# Patient Record
Sex: FEMALE | Race: WHITE | Employment: OTHER | URBAN - METROPOLITAN AREA
[De-identification: names, ages, dates, MRNs, and addresses within clinical notes are randomized per-mention and may not be internally consistent; named-entity substitution may affect disease eponyms.]

---

## 2022-07-08 ENCOUNTER — HOSPITAL ENCOUNTER (OUTPATIENT)
Age: 82
Discharge: HOME | End: 2022-07-08
Payer: MEDICARE

## 2022-07-08 VITALS
DIASTOLIC BLOOD PRESSURE: 80 MMHG | TEMPERATURE: 98 F | RESPIRATION RATE: 16 BRPM | SYSTOLIC BLOOD PRESSURE: 116 MMHG | OXYGEN SATURATION: 97 % | HEART RATE: 72 BPM
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
## 2022-07-08 DIAGNOSIS — L03.011 CELLULITIS OF FINGER OF RIGHT HAND: ICD-10-CM

## 2022-07-08 DIAGNOSIS — S61.258A CAT BITE OF INDEX FINGER, INITIAL ENCOUNTER: Primary | ICD-10-CM

## 2022-07-08 RX ORDER — OLMESARTAN MEDOXOMIL 20 MG/1
20 TABLET ORAL DAILY
COMMUNITY

## 2022-07-08 RX ORDER — LEVOTHYROXINE SODIUM 137 UG/1
137 TABLET ORAL
COMMUNITY

## 2022-07-08 RX ORDER — AMOXICILLIN AND CLAVULANATE POTASSIUM 875; 125 MG/1; MG/1
1 TABLET, FILM COATED ORAL 2 TIMES DAILY
Qty: 20 TABLET | Refills: 0 | Status: SHIPPED | OUTPATIENT
Start: 2022-07-08 | End: 2022-07-18

## 2024-07-12 ENCOUNTER — APPOINTMENT (OUTPATIENT)
Dept: GENERAL RADIOLOGY | Facility: HOSPITAL | Age: 84
End: 2024-07-12
Attending: EMERGENCY MEDICINE
Payer: MEDICARE

## 2024-07-12 ENCOUNTER — HOSPITAL ENCOUNTER (INPATIENT)
Facility: HOSPITAL | Age: 84
LOS: 4 days | Discharge: HOME | End: 2024-07-16
Attending: EMERGENCY MEDICINE | Admitting: HOSPITALIST
Payer: MEDICARE

## 2024-07-12 ENCOUNTER — APPOINTMENT (OUTPATIENT)
Dept: CT IMAGING | Facility: HOSPITAL | Age: 84
End: 2024-07-12
Attending: EMERGENCY MEDICINE
Payer: MEDICARE

## 2024-07-12 DIAGNOSIS — K56.609 SBO (SMALL BOWEL OBSTRUCTION) (HCC): Primary | ICD-10-CM

## 2024-07-12 LAB
ALBUMIN SERPL-MCNC: 4 G/DL (ref 3.4–5)
ALBUMIN/GLOB SERPL: 0.9 {RATIO} (ref 1–2)
ALP LIVER SERPL-CCNC: 102 U/L
ALT SERPL-CCNC: 27 U/L
ANION GAP SERPL CALC-SCNC: 9 MMOL/L (ref 0–18)
AST SERPL-CCNC: 32 U/L (ref 15–37)
BASOPHILS # BLD AUTO: 0.04 X10(3) UL (ref 0–0.2)
BASOPHILS NFR BLD AUTO: 0.3 %
BILIRUB SERPL-MCNC: 0.9 MG/DL (ref 0.1–2)
BILIRUB UR QL STRIP.AUTO: NEGATIVE
BUN BLD-MCNC: 18 MG/DL (ref 9–23)
CALCIUM BLD-MCNC: 10 MG/DL (ref 8.5–10.1)
CHLORIDE SERPL-SCNC: 103 MMOL/L (ref 98–112)
CO2 SERPL-SCNC: 22 MMOL/L (ref 21–32)
COLOR UR AUTO: YELLOW
CREAT BLD-MCNC: 1.12 MG/DL
EGFRCR SERPLBLD CKD-EPI 2021: 49 ML/MIN/1.73M2 (ref 60–?)
EOSINOPHIL # BLD AUTO: 0.01 X10(3) UL (ref 0–0.7)
EOSINOPHIL NFR BLD AUTO: 0.1 %
ERYTHROCYTE [DISTWIDTH] IN BLOOD BY AUTOMATED COUNT: 13.8 %
GLOBULIN PLAS-MCNC: 4.5 G/DL (ref 2.8–4.4)
GLUCOSE BLD-MCNC: 146 MG/DL (ref 70–99)
GLUCOSE UR STRIP.AUTO-MCNC: NORMAL MG/DL
HCT VFR BLD AUTO: 44 %
HGB BLD-MCNC: 15.9 G/DL
HYALINE CASTS #/AREA URNS AUTO: PRESENT /LPF
IMM GRANULOCYTES # BLD AUTO: 0.04 X10(3) UL (ref 0–1)
IMM GRANULOCYTES NFR BLD: 0.3 %
KETONES UR STRIP.AUTO-MCNC: 20 MG/DL
LEUKOCYTE ESTERASE UR QL STRIP.AUTO: 75
LIPASE SERPL-CCNC: 69 U/L (ref 13–75)
LYMPHOCYTES # BLD AUTO: 1.05 X10(3) UL (ref 1–4)
LYMPHOCYTES NFR BLD AUTO: 8.6 %
MCH RBC QN AUTO: 32.4 PG (ref 26–34)
MCHC RBC AUTO-ENTMCNC: 36.1 G/DL (ref 31–37)
MCV RBC AUTO: 89.6 FL
MONOCYTES # BLD AUTO: 0.43 X10(3) UL (ref 0.1–1)
MONOCYTES NFR BLD AUTO: 3.5 %
NEUTROPHILS # BLD AUTO: 10.7 X10 (3) UL (ref 1.5–7.7)
NEUTROPHILS # BLD AUTO: 10.7 X10(3) UL (ref 1.5–7.7)
NEUTROPHILS NFR BLD AUTO: 87.2 %
NITRITE UR QL STRIP.AUTO: NEGATIVE
OSMOLALITY SERPL CALC.SUM OF ELEC: 283 MOSM/KG (ref 275–295)
PH UR STRIP.AUTO: 5 [PH] (ref 5–8)
PLATELET # BLD AUTO: 326 10(3)UL (ref 150–450)
POTASSIUM SERPL-SCNC: 5.3 MMOL/L (ref 3.5–5.1)
PROT SERPL-MCNC: 8.5 G/DL (ref 6.4–8.2)
PROT UR STRIP.AUTO-MCNC: 70 MG/DL
RBC # BLD AUTO: 4.91 X10(6)UL
SODIUM SERPL-SCNC: 134 MMOL/L (ref 136–145)
SP GR UR STRIP.AUTO: 1.03 (ref 1–1.03)
UROBILINOGEN UR STRIP.AUTO-MCNC: 2 MG/DL
WBC # BLD AUTO: 12.3 X10(3) UL (ref 4–11)

## 2024-07-12 PROCEDURE — 87086 URINE CULTURE/COLONY COUNT: CPT | Performed by: EMERGENCY MEDICINE

## 2024-07-12 PROCEDURE — 85025 COMPLETE CBC W/AUTO DIFF WBC: CPT

## 2024-07-12 PROCEDURE — 74177 CT ABD & PELVIS W/CONTRAST: CPT | Performed by: EMERGENCY MEDICINE

## 2024-07-12 PROCEDURE — 83690 ASSAY OF LIPASE: CPT | Performed by: EMERGENCY MEDICINE

## 2024-07-12 PROCEDURE — 96361 HYDRATE IV INFUSION ADD-ON: CPT

## 2024-07-12 PROCEDURE — 96374 THER/PROPH/DIAG INJ IV PUSH: CPT

## 2024-07-12 PROCEDURE — 80053 COMPREHEN METABOLIC PANEL: CPT

## 2024-07-12 PROCEDURE — 80053 COMPREHEN METABOLIC PANEL: CPT | Performed by: EMERGENCY MEDICINE

## 2024-07-12 PROCEDURE — 99285 EMERGENCY DEPT VISIT HI MDM: CPT

## 2024-07-12 PROCEDURE — 85025 COMPLETE CBC W/AUTO DIFF WBC: CPT | Performed by: EMERGENCY MEDICINE

## 2024-07-12 PROCEDURE — 74018 RADEX ABDOMEN 1 VIEW: CPT | Performed by: EMERGENCY MEDICINE

## 2024-07-12 PROCEDURE — 81001 URINALYSIS AUTO W/SCOPE: CPT | Performed by: EMERGENCY MEDICINE

## 2024-07-12 PROCEDURE — 96375 TX/PRO/DX INJ NEW DRUG ADDON: CPT

## 2024-07-12 PROCEDURE — S0028 INJECTION, FAMOTIDINE, 20 MG: HCPCS | Performed by: EMERGENCY MEDICINE

## 2024-07-12 PROCEDURE — 71045 X-RAY EXAM CHEST 1 VIEW: CPT | Performed by: EMERGENCY MEDICINE

## 2024-07-12 RX ORDER — ONDANSETRON 2 MG/ML
4 INJECTION INTRAMUSCULAR; INTRAVENOUS EVERY 4 HOURS PRN
Status: DISCONTINUED | OUTPATIENT
Start: 2024-07-12 | End: 2024-07-12 | Stop reason: ALTCHOICE

## 2024-07-12 RX ORDER — ONDANSETRON 2 MG/ML
4 INJECTION INTRAMUSCULAR; INTRAVENOUS EVERY 6 HOURS PRN
Status: DISCONTINUED | OUTPATIENT
Start: 2024-07-12 | End: 2024-07-16

## 2024-07-12 RX ORDER — HYDRALAZINE HYDROCHLORIDE 20 MG/ML
10 INJECTION INTRAMUSCULAR; INTRAVENOUS EVERY 6 HOURS PRN
Status: DISCONTINUED | OUTPATIENT
Start: 2024-07-12 | End: 2024-07-16

## 2024-07-12 RX ORDER — MORPHINE SULFATE 4 MG/ML
4 INJECTION, SOLUTION INTRAMUSCULAR; INTRAVENOUS ONCE
Status: COMPLETED | OUTPATIENT
Start: 2024-07-12 | End: 2024-07-12

## 2024-07-12 RX ORDER — SODIUM CHLORIDE 9 MG/ML
INJECTION, SOLUTION INTRAVENOUS CONTINUOUS
Status: ACTIVE | OUTPATIENT
Start: 2024-07-12 | End: 2024-07-12

## 2024-07-12 RX ORDER — SODIUM CHLORIDE 9 MG/ML
INJECTION, SOLUTION INTRAVENOUS CONTINUOUS
Status: DISCONTINUED | OUTPATIENT
Start: 2024-07-12 | End: 2024-07-15

## 2024-07-12 RX ORDER — ESTRADIOL AND LEVONORGESTREL .045; .015 MG/D; MG/D
1 PATCH TRANSDERMAL WEEKLY
COMMUNITY

## 2024-07-12 RX ORDER — HEPARIN SODIUM 5000 [USP'U]/ML
5000 INJECTION, SOLUTION INTRAVENOUS; SUBCUTANEOUS EVERY 8 HOURS SCHEDULED
Status: DISCONTINUED | OUTPATIENT
Start: 2024-07-12 | End: 2024-07-16

## 2024-07-12 RX ORDER — MORPHINE SULFATE 2 MG/ML
2 INJECTION, SOLUTION INTRAMUSCULAR; INTRAVENOUS EVERY 2 HOUR PRN
Status: DISCONTINUED | OUTPATIENT
Start: 2024-07-12 | End: 2024-07-16

## 2024-07-12 RX ORDER — FAMOTIDINE 10 MG/ML
20 INJECTION, SOLUTION INTRAVENOUS ONCE
Status: COMPLETED | OUTPATIENT
Start: 2024-07-12 | End: 2024-07-12

## 2024-07-12 RX ORDER — MORPHINE SULFATE 4 MG/ML
4 INJECTION, SOLUTION INTRAMUSCULAR; INTRAVENOUS EVERY 2 HOUR PRN
Status: DISCONTINUED | OUTPATIENT
Start: 2024-07-12 | End: 2024-07-16

## 2024-07-12 RX ORDER — ONDANSETRON 2 MG/ML
4 INJECTION INTRAMUSCULAR; INTRAVENOUS ONCE
Status: COMPLETED | OUTPATIENT
Start: 2024-07-12 | End: 2024-07-12

## 2024-07-12 RX ORDER — MORPHINE SULFATE 2 MG/ML
1 INJECTION, SOLUTION INTRAMUSCULAR; INTRAVENOUS EVERY 2 HOUR PRN
Status: DISCONTINUED | OUTPATIENT
Start: 2024-07-12 | End: 2024-07-16

## 2024-07-12 NOTE — CONSULTS
Suly Nicolas is a 84 year old female  Chief Complaint   Patient presents with    Abdomen/Flank Pain       HPI: 83 y/o female seen in er with small bowel obstruction  Similar episode at [facility] several years ago  Resolved with medical management  Previous gallbladder and appendix removal via long midline incision 40 years ago          Past Medical History:    Essential hypertension    Thyroid disease     Past Surgical History:   Procedure Laterality Date    Appendectomy       No family history on file.  Social History     Socioeconomic History    Marital status:    Tobacco Use    Smoking status: Never    Smokeless tobacco: Never     Social Determinants of Health      Received from [facility]    Housing Stability           EXAM: abd soft mild distention  long midline scar  non tender      IMAGING: cat scan report and images reviewed and discussed with her      IMPRESSION: small bowel obstruction      PLAN: ng decompression  Ivf  Observation  I reviewed possible surgical exploration if no improvement

## 2024-07-12 NOTE — PROGRESS NOTES
Alert & oriented x4. VSS on room air. DTV. NG tube secured at 52cm set to LIS. NPO with ice chips. Ambulates with standby assist. Denies chest pain/SOB. PRN zofran given for nausea/vomiting. Pain controlled per MAR. Patient updated on plan of care. Questions and concerns addressed. Safety precautions in place. Frequent rounds performed.

## 2024-07-12 NOTE — H&P
[facility] Hospitalist History and Physical      Chief Complaint   Patient presents with    Abdomen/Flank Pain        PCP: MANDY Velasco      History of Present Illness: Patient is a 84 year old female with PMH sig for HTN and hypothyroidism who presented to the ED for evaluation of abdominal pain with nausea and vomiting.  She states the pain started last night at round 7 pm, associated with multiple episodes of nausea and vomiting.   She states the pain was severe and cramping in nature.  It continued to worsen so she was brought to the ED.  Of note, pt had an SBO about 3 years ago that was managed with NGT.  No F/C.  She lives with her son and daughter in law.      In the ED, CT a/p showed SBO with transition point within the R mid abdomen in the region of the distal ileum.  NGT placed to LIS.      On my evaluation, pt states her abd pain is better.  Daughter at bedside.      Past Medical History:    Essential hypertension    Thyroid disease      Past Surgical History:   Procedure Laterality Date    Appendectomy          ALL:  No Known Allergies     Prior to Admission Medications   Medication Sig    levothyroxine 137 MCG Oral Tab Take 137 mcg by mouth before breakfast.    olmesartan 20 MG Oral Tab Take 20 mg by mouth daily.       Social History     Tobacco Use    Smoking status: Never    Smokeless tobacco: Never   Substance Use Topics    Alcohol use: Not on file        Fam Hx  No family history on file.    Review of Systems  Comprehensive ROS reviewed and negative except for what is stated in HPI.      OBJECTIVE:  /65   Pulse 95   Temp 97.8 °F (36.6 °C) (Temporal)   Resp 18   Wt 198 lb 6.6 oz (90 kg)   SpO2 96%   Gen: No acute distress, alert and oriented x3, no focal neurologic deficits  HEENT:  EOMI, PERRLA, OP clear, MMM.  NGT in place with bilious output.    Pulm: Lungs clear bilaterally, normal respiratory effort  CV: Heart with regular rate and rhythm, no murmur.  Normal PMI.     Abd: Abdomen soft, +mild TTP, moderately distended, no organomegaly, bowel sounds present  MSK: Full range of motion in extremities, no clubbing, no cyanosis  Skin: no rashes or lesions  Neuro:  Grossly intact, no focal deficits      Data Review:    LABS:   Lab Results   Component Value Date    WBC 12.3 07/12/2024    HGB 15.9 07/12/2024    HCT 44.0 07/12/2024    .0 07/12/2024    CREATSERUM 1.12 07/12/2024    BUN 18 07/12/2024     07/12/2024    K 5.3 07/12/2024     07/12/2024    CO2 22.0 07/12/2024     07/12/2024    CA 10.0 07/12/2024    ALB 4.0 07/12/2024    ALKPHO 102 07/12/2024    BILT 0.9 07/12/2024    TP 8.5 07/12/2024    AST 32 07/12/2024    ALT 27 07/12/2024    LIP 69 07/12/2024       CXR: image personally reviewed.      Radiology: CT ABDOMEN+PELVIS(CONTRAST ONLY)(CPT=74177)    Result Date: 7/12/2024  PROCEDURE:  CT ABDOMEN+PELVIS (CONTRAST ONLY) (CPT=74177)  COMPARISON:  None.  INDICATIONS:  hx of bowel obstruction, vomiting all night, abd pain, sweating, pt sts feels like obstruction  TECHNIQUE:  CT scanning was performed from the dome of the diaphragm to the pubic symphysis with non-ionic intravenous contrast material. Post contrast coronal MPR imaging was performed.  Dose reduction techniques were used. Dose information is transmitted to the ACR (American College of Radiology) NRDR (National Radiology Data Registry) which includes the Dose Index Registry.  PATIENT STATED HISTORY:(As transcribed by Technologist)  The patient complaints of bilateral upper quadrants abdominal pain, and nausea.   CONTRAST USED:  100cc of Isovue 370  FINDINGS:  LIVER:  No enlargement, atrophy, abnormal density, or significant focal lesion.  BILIARY:  Mild intra and extrahepatic biliary ductal dilatation is present with surgical clips within the right upper quadrant. PANCREAS:  No lesion, fluid collection, ductal dilatation, or atrophy.  SPLEEN:  No enlargement or focal lesion.  KIDNEYS:  There are  areas of focal cortical parenchymal loss which may represent prior infectious or ischemic insult.  10 mm possible cyst of the left lower pole present.  No specific further follow-up is suggested. ADRENALS:  No mass or enlargement.  AORTA/VASCULAR:  No aneurysm or dissection.  RETROPERITONEUM:  No mass or adenopathy.  BOWEL/MESENTERY:  Moderate-sized hiatal hernia is present.  Numerous dilated loops of small bowel are present.  These measure up to approximately 4.7 cm.  Transition point noted within the right mid abdomen on series 2, image 60 involving the distal ileum.  No evidence of pneumatosis or free intraperitoneal air at this time. ABDOMINAL WALL:  Small right inguinal hernia containing fat present. URINARY BLADDER:  Urinary bladder is decompressed with a 4 mm stone present. PELVIC NODES:  No adenopathy.  PELVIC ORGANS:  Unremarkable CT appearance although please note that uterus and ovaries are not well assessed with CT. BONES:  Multilevel degenerative changes noted throughout the thoracolumbar spine and pelvis. LUNG BASES:  No visible pulmonary or pleural disease.  OTHER:  Negative.             CONCLUSION:  Dilated loops of small bowel are present with a transition point within the right mid abdomen in the region of the distal ileum.  No evidence of free intraperitoneal air or pneumatosis at this time.  4 mm bladder calculus is present.   LOCATION:  [id number]   Dictated by (CST): Ben Perez MD on 7/12/2024 at 8:58 AM     Finalized by (CST): Ben Perez MD on 7/12/2024 at 9:06 AM          Assessment/Plan:     84 yr old female with PMH sig for HTN and hypothyroidism who presented to the ED for evaluation of abdominal pain with nausea and vomiting.    # Small bowel obstruction   - suspect adhesive related to prior abdominal surgeries   - cont NGT to LIS  - NPO  - IVFs  - pain  control, anti-emetics   - gen surg c/s appreciated     # Essential HTN  - elevated on admit  - hold olmesartan   - PRN IV hydralazine while  NPO    # Hypothyroidism   - hold levothyroxine while NPO  - start IV levothyroxine if NPO > 72 hours    DVT porphy - hep subcutaneous  Dispo: inpt care.  POC d/w pt and her daughter at bedside who agrees.     Outpatient records or previous hospital records reviewed.   [facility] hospitalist to continue to follow patient while in house  A total of 76 minutes taken with patient and coordinating care.  Greater than 50% face to face encounter.      Ramana Stockton DO  [facility] Hospitalist

## 2024-07-12 NOTE — ED PROVIDER NOTES
Patient Seen in: [facility] Emergency Department      History     Chief Complaint   Patient presents with    Abdomen/Flank Pain     Stated Complaint: hx of bowel obstruction, vomiting all night, abd pain, sweating, pt sts feels l*    Subjective:   HPI    84-year-old female presents today for evaluation of abdominal pain and vomiting.  Patient has reported increasing heartburn over the last month.  Her last bowel movement was 3:30 in the afternoon yesterday.  She woke this morning and felt nauseous.  Since 3 AM, she had 9 episodes of emesis.  She has not had any fevers or diarrhea.  She reports an upper abdominal discomfort.  She states the symptoms are similar to previous bowel obstructions.    Objective:   Past Medical History:    Essential hypertension    Thyroid disease              Past Surgical History:   Procedure Laterality Date    Appendectomy                  Social History     Socioeconomic History    Marital status:    Tobacco Use    Smoking status: Never    Smokeless tobacco: Never     Social Determinants of Health      Received from [facility]    Housing Stability              Review of Systems    Positive for stated Chief Complaint: Abdomen/Flank Pain    Other systems are as noted in HPI.  Constitutional and vital signs reviewed.      All other systems reviewed and negative except as noted above.    Physical Exam     ED Triage Vitals [07/12/24 0600]   BP (!) 164/80   Pulse 110   Resp 18   Temp 97.8 °F (36.6 °C)   Temp src Temporal   SpO2 98 %   O2 Device None (Room air)       Current Vitals:   Vital Signs  BP: 135/65  Pulse: 95  Resp: 18  Temp: 97.8 °F (36.6 °C)  Temp src: Temporal  MAP (mmHg): 85    Oxygen Therapy  SpO2: 96 %  O2 Device: None (Room air)            Physical Exam  Vitals and nursing note reviewed.   Constitutional:       Appearance: Normal appearance.   HENT:      Head: Normocephalic.      Nose: Nose normal.      Mouth/Throat:      Mouth: Mucous  membranes are moist.   Eyes:      Extraocular Movements: Extraocular movements intact.   Cardiovascular:      Rate and Rhythm: Normal rate.   Pulmonary:      Effort: Pulmonary effort is normal.   Abdominal:      General: Abdomen is flat.      Tenderness: There is abdominal tenderness in the right upper quadrant and epigastric area. There is no guarding or rebound.   Musculoskeletal:         General: Normal range of motion.   Skin:     General: Skin is warm.   Neurological:      General: No focal deficit present.      Mental Status: She is alert.   Psychiatric:         Mood and Affect: Mood normal.         ED Course     Labs Reviewed   URINALYSIS WITH CULTURE REFLEX - Abnormal; Notable for the following components:       Result Value    Clarity Urine Turbid (*)     Ketones Urine 20 (*)     Blood Urine 2+ (*)     Protein Urine 70 (*)     Urobilinogen Urine 2 (*)     Leukocyte Esterase Urine 75 (*)     Bacteria Urine Rare (*)     Squamous Epi. Cells Moderate (*)     Hyaline Casts Present (*)     All other components within normal limits   COMP METABOLIC PANEL (14) - Abnormal; Notable for the following components:    Glucose 146 (*)     Sodium 134 (*)     Potassium 5.3 (*)     Creatinine 1.12 (*)     eGFR-Cr 49 (*)     Total Protein 8.5 (*)     Globulin  4.5 (*)     A/G Ratio 0.9 (*)     All other components within normal limits   CBC W/ DIFFERENTIAL - Abnormal; Notable for the following components:    WBC 12.3 (*)     Neutrophil Absolute Prelim 10.70 (*)     Neutrophil Absolute 10.70 (*)     All other components within normal limits   LIPASE - Normal   CBC WITH DIFFERENTIAL WITH PLATELET    Narrative:     The following orders were created for panel order CBC With Differential With Platelet.  Procedure                               Abnormality         Status                     ---------                               -----------         ------                     CBC W/ DIFFERENTIAL[[id number]]          Abnormal             Final result                 Please view results for these tests on the individual orders.   RAINBOW DRAW LAVENDER   RAINBOW DRAW LIGHT GREEN   RAINBOW DRAW BLUE   URINE CULTURE, ROUTINE             XR CHEST AP PORTABLE  (CPT=71045)    Result Date: 7/12/2024  PROCEDURE:  XR CHEST AP PORTABLE  (CPT=71045)  TECHNIQUE:  AP chest radiograph was obtained.  COMPARISON:  [facility] , CT, CT ABDOMEN+PELVIS(CONTRAST ONLY)(CPT=74177), 7/12/2024, 8:33 AM.  INDICATIONS:  Verify correct tube placement  PATIENT STATED HISTORY: (As transcribed by Technologist)  Patient shares that she had some stomach issues over past few days. Patient has history of a blockage.              CONCLUSION:    Hiatal hernia is present.  Nasogastric tube has been placed.  It reaches at least the level of the gastroesophageal junction, with the distal tip may be nonvisualized because of degree of penetration.  Suggest obtaining additional x-ray view, centered over the lower chest and upper abdomen for better visualization of the tip of the nasogastric tube.  Basilar atelectasis bilateral.  Rim calcified left breast implant noted.  LOCATION:  [facility]      Dictated by (CST): Hong Pérez MD on 7/12/2024 at 9:43 AM     Finalized by (CST): Hong Pérez MD on 7/12/2024 at 9:44 AM       CT ABDOMEN+PELVIS(CONTRAST ONLY)(CPT=74177)    Result Date: 7/12/2024  PROCEDURE:  CT ABDOMEN+PELVIS (CONTRAST ONLY) (CPT=74177)  COMPARISON:  None.  INDICATIONS:  hx of bowel obstruction, vomiting all night, abd pain, sweating, pt sts feels like obstruction  TECHNIQUE:  CT scanning was performed from the dome of the diaphragm to the pubic symphysis with non-ionic intravenous contrast material. Post contrast coronal MPR imaging was performed.  Dose reduction techniques were used. Dose information is transmitted to the ACR (American College of Radiology) NRDR (National Radiology Data Registry) which includes the Dose Index Registry.  PATIENT STATED HISTORY:(As transcribed by  Technologist)  The patient complaints of bilateral upper quadrants abdominal pain, and nausea.   CONTRAST USED:  100cc of Isovue 370  FINDINGS:  LIVER:  No enlargement, atrophy, abnormal density, or significant focal lesion.  BILIARY:  Mild intra and extrahepatic biliary ductal dilatation is present with surgical clips within the right upper quadrant. PANCREAS:  No lesion, fluid collection, ductal dilatation, or atrophy.  SPLEEN:  No enlargement or focal lesion.  KIDNEYS:  There are areas of focal cortical parenchymal loss which may represent prior infectious or ischemic insult.  10 mm possible cyst of the left lower pole present.  No specific further follow-up is suggested. ADRENALS:  No mass or enlargement.  AORTA/VASCULAR:  No aneurysm or dissection.  RETROPERITONEUM:  No mass or adenopathy.  BOWEL/MESENTERY:  Moderate-sized hiatal hernia is present.  Numerous dilated loops of small bowel are present.  These measure up to approximately 4.7 cm.  Transition point noted within the right mid abdomen on series 2, image 60 involving the distal ileum.  No evidence of pneumatosis or free intraperitoneal air at this time. ABDOMINAL WALL:  Small right inguinal hernia containing fat present. URINARY BLADDER:  Urinary bladder is decompressed with a 4 mm stone present. PELVIC NODES:  No adenopathy.  PELVIC ORGANS:  Unremarkable CT appearance although please note that uterus and ovaries are not well assessed with CT. BONES:  Multilevel degenerative changes noted throughout the thoracolumbar spine and pelvis. LUNG BASES:  No visible pulmonary or pleural disease.  OTHER:  Negative.             CONCLUSION:  Dilated loops of small bowel are present with a transition point within the right mid abdomen in the region of the distal ileum.  No evidence of free intraperitoneal air or pneumatosis at this time.  4 mm bladder calculus is present.   LOCATION:  [id number]   Dictated by (CST): Ben Perez MD on 7/12/2024 at 8:58 AM     Finalized  by (CST): Ben Perez MD on 7/12/2024 at 9:06 AM               [facility]      Differential Diagnosis  84-year-old female presents today for evaluation of 9 episodes of emesis over the last few hours along with an upper abdominal discomfort.  She states the pain is similar to her previous bowel obstruction.  Differential include bowel obstruction, pancreatitis, choledocholithiasis.  Plan for CT along with labs.  Will reassess    9:31 am  CT concerning for bowel obstruction.  NG tube ordered.  I spoke with the hospitalist and general surgery in regards to admission.  Patient updated on findings and agreeable to admission.    Discussions of Management    I spoke with the hospitalist and general surgery in regards to admission.      -----  RN advanced NG tube after second x ray      Admission disposition: 7/12/2024  9:31 AM                                        Medical Decision Making      Disposition and Plan     Clinical Impression:  1. SBO (small bowel obstruction) ([facility])         Disposition:  Admit  7/12/2024  9:31 am    Follow-up:  No follow-up provider specified.        Medications Prescribed:  Current Discharge Medication List                            Hospital Problems       Present on Admission  Date Reviewed: 7/8/2022            ICD-10-CM Noted POA    * (Principal) SBO (small bowel obstruction) ([facility]) K56.609 7/12/2024 Unknown

## 2024-07-12 NOTE — ED QUICK NOTES
Orders for admission, patient is aware of plan and ready to go upstairs. Any questions, please call ED RN Renetta at extension [phone].     Patient Covid vaccination status: Partially vaccinated     COVID Test Ordered in ED: None    COVID Suspicion at Admission: N/A    Running Infusions:  None    Mental Status/LOC at time of transport: A&O x 4, family support at bedside.    Other pertinent information:   CIWA score: N/A   NIH score:  N/A

## 2024-07-13 ENCOUNTER — APPOINTMENT (OUTPATIENT)
Dept: GENERAL RADIOLOGY | Facility: HOSPITAL | Age: 84
End: 2024-07-13
Attending: SURGERY
Payer: MEDICARE

## 2024-07-13 LAB
ANION GAP SERPL CALC-SCNC: 11 MMOL/L (ref 0–18)
BASOPHILS # BLD AUTO: 0.01 X10(3) UL (ref 0–0.2)
BASOPHILS NFR BLD AUTO: 0.1 %
BUN BLD-MCNC: 23 MG/DL (ref 9–23)
CALCIUM BLD-MCNC: 8.3 MG/DL (ref 8.5–10.1)
CHLORIDE SERPL-SCNC: 109 MMOL/L (ref 98–112)
CO2 SERPL-SCNC: 21 MMOL/L (ref 21–32)
CREAT BLD-MCNC: 0.84 MG/DL
EGFRCR SERPLBLD CKD-EPI 2021: 69 ML/MIN/1.73M2 (ref 60–?)
EOSINOPHIL # BLD AUTO: 0 X10(3) UL (ref 0–0.7)
EOSINOPHIL NFR BLD AUTO: 0 %
ERYTHROCYTE [DISTWIDTH] IN BLOOD BY AUTOMATED COUNT: 14.4 %
GLUCOSE BLD-MCNC: 95 MG/DL (ref 70–99)
HCT VFR BLD AUTO: 38.3 %
HGB BLD-MCNC: 13.1 G/DL
IMM GRANULOCYTES # BLD AUTO: 0.03 X10(3) UL (ref 0–1)
IMM GRANULOCYTES NFR BLD: 0.4 %
LYMPHOCYTES # BLD AUTO: 1.09 X10(3) UL (ref 1–4)
LYMPHOCYTES NFR BLD AUTO: 13.2 %
MCH RBC QN AUTO: 31.3 PG (ref 26–34)
MCHC RBC AUTO-ENTMCNC: 34.2 G/DL (ref 31–37)
MCV RBC AUTO: 91.4 FL
MONOCYTES # BLD AUTO: 0.57 X10(3) UL (ref 0.1–1)
MONOCYTES NFR BLD AUTO: 6.9 %
NEUTROPHILS # BLD AUTO: 6.53 X10 (3) UL (ref 1.5–7.7)
NEUTROPHILS # BLD AUTO: 6.53 X10(3) UL (ref 1.5–7.7)
NEUTROPHILS NFR BLD AUTO: 79.4 %
OSMOLALITY SERPL CALC.SUM OF ELEC: 295 MOSM/KG (ref 275–295)
PLATELET # BLD AUTO: 259 10(3)UL (ref 150–450)
POTASSIUM SERPL-SCNC: 4.2 MMOL/L (ref 3.5–5.1)
RBC # BLD AUTO: 4.19 X10(6)UL
SODIUM SERPL-SCNC: 141 MMOL/L (ref 136–145)
WBC # BLD AUTO: 8.2 X10(3) UL (ref 4–11)

## 2024-07-13 PROCEDURE — 74250 X-RAY XM SM INT 1CNTRST STD: CPT | Performed by: SURGERY

## 2024-07-13 PROCEDURE — 80048 BASIC METABOLIC PNL TOTAL CA: CPT | Performed by: INTERNAL MEDICINE

## 2024-07-13 PROCEDURE — 85025 COMPLETE CBC W/AUTO DIFF WBC: CPT | Performed by: INTERNAL MEDICINE

## 2024-07-13 RX ORDER — KETOROLAC TROMETHAMINE 15 MG/ML
15 INJECTION, SOLUTION INTRAMUSCULAR; INTRAVENOUS EVERY 6 HOURS PRN
Status: DISPENSED | OUTPATIENT
Start: 2024-07-13 | End: 2024-07-15

## 2024-07-13 NOTE — PROGRESS NOTES
[facility]   part of [facility] Hospitalist Progress Note     Suly Nicolas Patient Status:  Inpatient    1940 MRN [id number]   Location [facility] 3NW-A Attending Ramana Stockton, DO   Hosp Day # 1 PCP MANDY Velasco     Follow Up:  The encounter diagnosis was SBO (small bowel obstruction) (HCC).    Subjective:     Patient seen and examined.  Back from SBFT.  She c/o some lower abd pain, but improved with morphine IV.  Had an episode of emesis after drinking the barium earlier.  No fevers.      Objective:    Review of Systems:   10 point ROS completed and was negative, except for pertinent positive and negatives stated in subjective.    Vital signs:  Temp:  [98.2 °F (36.8 °C)-99.1 °F (37.3 °C)] 98.7 °F (37.1 °C)  Pulse:  [] 56  Resp:  [16-18] 18  BP: (135-147)/(60-75) 135/60  SpO2:  [94 %-99 %] 97 %    Physical Exam:    Gen: No acute distress, alert and oriented x3, no focal neurologic deficits  HEENT:  EOMI, PERRLA, OP clear, MMM.  NGT in place with bilious output.    Pulm: Lungs clear bilaterally, normal respiratory effort  CV: Heart with regular rate and rhythm, no murmur.  Normal PMI.    Abd: Abdomen soft, +mild TTP, moderately distended, no organomegaly, bowel sounds present  MSK: Full range of motion in extremities, no clubbing, no cyanosis  Skin: no rashes or lesions  Neuro:  Grossly intact, no focal deficits      Diagnostic Data:    Labs:  Recent Labs   Lab 24  0637 24  0824   WBC 12.3* 8.2   HGB 15.9 13.1   MCV 89.6 91.4   .0 259.0       Recent Labs   Lab 24  0637 24  0824   * 95   BUN 18 23   CREATSERUM 1.12* 0.84   CA 10.0 8.3*   ALB 4.0  --    * 141   K 5.3* 4.2    109   CO2 22.0 21.0   ALKPHO 102  --    AST 32  --    ALT 27  --    BILT 0.9  --    TP 8.5*  --        Estimated Creatinine Clearance: 46.5 mL/min (based on SCr of 0.84 mg/dL).    No results for input(s): \"PTP\", \"INR\" in the last  168 hours.         COVID-19 Lab Results    COVID-19  No results found for: \"COVID19\"    Pro-Calcitonin  No results for input(s): \"PCT\" in the last 168 hours.    Cardiac  No results for input(s): \"TROP\", \"PBNP\" in the last 168 hours.    Creatinine Kinase  No results for input(s): \"CK\" in the last 168 hours.    Inflammatory Markers  No results for input(s): \"CRP\", \"FRANCO\", \"LDH\", \"DDIMER\" in the last 168 hours.    Imaging: Imaging data reviewed in Epic.    Medications:    heparin  5,000 Units Subcutaneous Q8H QUENTIN       Assessment & Plan:      84 yr old female with PMH sig for HTN and hypothyroidism who presented to the ED for evaluation of abdominal pain with nausea and vomiting.     # Small bowel obstruction   - suspect adhesive related to prior abdominal surgeries   - cont NGT to LIS  - NPO  - IVFs  - await SBFT     # Essential HTN  - elevated on admit  - hold olmesartan   - PRN IV hydralazine while NPO     # Hypothyroidism   - hold levothyroxine while NPO  - start IV levothyroxine if NPO > 72 hours    Plan of care: inpt care.     Plan of care discussed with patient or family at bedside.    Ramana Stockton DO    Supplementary Documentation:     Quality:  DVT Prophylaxis: hep subcutaneous   CODE status: FULL  Gandhi: no  Central line: no  If COVID testing is negative, may discontinue isolation: yes     Estimated date of discharge: TBD  Discharge is dependent on: clinical course   At this point Ms. Nicolas is expected to be discharge to: home     Plan of care discussed with pt

## 2024-07-13 NOTE — PROGRESS NOTES
Pt. Returned from xray and had a large emesis.  Zofran given.  Toradol given for lower back pain from laying on the table.  Per xray do not hook NGT back until follow-up xrays done.

## 2024-07-13 NOTE — PROGRESS NOTES
Small bowel follow thru personally reviewed    Reveals high grade small bowel obstruction    Surgical exploration scheduled for 1030am Sunday    Ng to suction until surgery

## 2024-07-13 NOTE — PROGRESS NOTES
Suly Nicolas is a 84 year old female  Chief Complaint   Patient presents with    Abdomen/Flank Pain       HPI: 85 y/o female admitted yesterday with small bowel obstruction  Comfortable thou no flatus          Past Medical History:    Essential hypertension    Thyroid disease     Past Surgical History:   Procedure Laterality Date    Appendectomy       No family history on file.  Social History     Socioeconomic History    Marital status:    Tobacco Use    Smoking status: Never    Smokeless tobacco: Never     Social Determinants of Health     Food Insecurity: No Food Insecurity (7/12/2024)    Food Insecurity     Food Insecurity: Never true   Transportation Needs: No Transportation Needs (7/12/2024)    Transportation Needs     Lack of Transportation: No   Housing Stability: Low Risk  (7/12/2024)    Housing Stability     Housing Instability: No           EXAM: abd soft moderate distention  non tender            IMPRESSION: small bowel obstruction      PLAN: sbft today to r/o high grade obstruction  I reviewed surgery if it becomes necessary

## 2024-07-13 NOTE — PROGRESS NOTES
Pt is alert and oriented, VSS  on room air.   NGT secured at 52cm, to LIS, output is brown. Keeping NPO with ice chips at times. Reports no nausea tonight. Pain is mild to moderate, Prn Morphine give 1 time. Pt encouraged to ambulate. Heparin subcu for DVT prophylaxis. Pt updated on the plan of care, call light in reach.

## 2024-07-13 NOTE — PLAN OF CARE
Abd soft, distended, tender, declines pain meds, no n/v.  NGT to LIS with brown drainage.  NPO except ice.

## 2024-07-14 ENCOUNTER — APPOINTMENT (OUTPATIENT)
Dept: GENERAL RADIOLOGY | Facility: HOSPITAL | Age: 84
End: 2024-07-14
Attending: SURGERY
Payer: MEDICARE

## 2024-07-14 LAB
ANION GAP SERPL CALC-SCNC: 9 MMOL/L (ref 0–18)
BUN BLD-MCNC: 26 MG/DL (ref 9–23)
CALCIUM BLD-MCNC: 7.7 MG/DL (ref 8.5–10.1)
CHLORIDE SERPL-SCNC: 111 MMOL/L (ref 98–112)
CO2 SERPL-SCNC: 20 MMOL/L (ref 21–32)
CREAT BLD-MCNC: 0.74 MG/DL
EGFRCR SERPLBLD CKD-EPI 2021: 81 ML/MIN/1.73M2 (ref 60–?)
GLUCOSE BLD-MCNC: 70 MG/DL (ref 70–99)
OSMOLALITY SERPL CALC.SUM OF ELEC: 293 MOSM/KG (ref 275–295)
PLATELET # BLD AUTO: 224 10(3)UL (ref 150–450)
POTASSIUM SERPL-SCNC: 3.7 MMOL/L (ref 3.5–5.1)
SODIUM SERPL-SCNC: 140 MMOL/L (ref 136–145)
TSI SER-ACNC: 2.08 MIU/ML (ref 0.36–3.74)

## 2024-07-14 PROCEDURE — 74019 RADEX ABDOMEN 2 VIEWS: CPT | Performed by: SURGERY

## 2024-07-14 PROCEDURE — 71045 X-RAY EXAM CHEST 1 VIEW: CPT | Performed by: SURGERY

## 2024-07-14 PROCEDURE — 84443 ASSAY THYROID STIM HORMONE: CPT | Performed by: INTERNAL MEDICINE

## 2024-07-14 PROCEDURE — 85049 AUTOMATED PLATELET COUNT: CPT | Performed by: INTERNAL MEDICINE

## 2024-07-14 PROCEDURE — 80048 BASIC METABOLIC PNL TOTAL CA: CPT | Performed by: INTERNAL MEDICINE

## 2024-07-14 RX ORDER — LEVOTHYROXINE SODIUM 20 UG/ML
100 INJECTION, SOLUTION INTRAVENOUS DAILY
Status: DISCONTINUED | OUTPATIENT
Start: 2024-07-21 | End: 2024-07-15

## 2024-07-14 NOTE — PROGRESS NOTES
[facility]   part of [facility] Hospitalist Progress Note     Suly Nicolas Patient Status:  Inpatient    1940 MRN [id number]   Location [facility] 3NW-A Attending Ramana Stockton, DO   Hosp Day # 2 PCP MANDY Velasco     Follow Up:  The encounter diagnosis was SBO (small bowel obstruction) (HCC).    Subjective:     Patient seen and examined.  SBFT yesterday showed high grade obstruction.  Plan for surgical exploration today.   Denies worsening pain.  Had several BM's overnight.     Objective:    Review of Systems:   10 point ROS completed and was negative, except for pertinent positive and negatives stated in subjective.    Vital signs:  Temp:  [98.7 °F (37.1 °C)-99.2 °F (37.3 °C)] 99 °F (37.2 °C)  Pulse:  [56-93] 93  Resp:  [18] 18  BP: (135-147)/(60-79) 137/61  SpO2:  [93 %-97 %] 93 %    Physical Exam:    Gen: No acute distress, alert and oriented x3, no focal neurologic deficits  HEENT:  EOMI, PERRLA, OP clear, MMM.  NGT in place with bilious output.    Pulm: Lungs clear bilaterally, normal respiratory effort  CV: Heart with regular rate and rhythm, no murmur.  Normal PMI.    Abd: Abdomen soft, +mild TTP, moderately distended, no organomegaly, bowel sounds present  MSK: Full range of motion in extremities, no clubbing, no cyanosis  Skin: no rashes or lesions  Neuro:  Grossly intact, no focal deficits      Diagnostic Data:    Labs:  Recent Labs   Lab 24  0637 24  0824  0600   WBC 12.3* 8.2  --    HGB 15.9 13.1  --    MCV 89.6 91.4  --    .0 259.0 224.0       Recent Labs   Lab 24  0637 24  0824 24  0600   * 95 70   BUN 18 23 26*   CREATSERUM 1.12* 0.84 0.74   CA 10.0 8.3* 7.7*   ALB 4.0  --   --    * 141 140   K 5.3* 4.2 3.7    109 111   CO2 22.0 21.0 20.0*   ALKPHO 102  --   --    AST 32  --   --    ALT 27  --   --    BILT 0.9  --   --    TP 8.5*  --   --        Estimated Creatinine Clearance:  52.7 mL/min (based on SCr of 0.74 mg/dL).    No results for input(s): \"PTP\", \"INR\" in the last 168 hours.         COVID-19 Lab Results    COVID-19  No results found for: \"COVID19\"    Pro-Calcitonin  No results for input(s): \"PCT\" in the last 168 hours.    Cardiac  No results for input(s): \"TROP\", \"PBNP\" in the last 168 hours.    Creatinine Kinase  No results for input(s): \"CK\" in the last 168 hours.    Inflammatory Markers  No results for input(s): \"CRP\", \"FRANCO\", \"LDH\", \"DDIMER\" in the last 168 hours.    Imaging: Imaging data reviewed in Epic.    Medications:    pantoprazole  40 mg Intravenous Q24H    heparin  5,000 Units Subcutaneous Q8H QUENTIN       Assessment & Plan:      84 yr old female with PMH sig for HTN and hypothyroidism who presented to the ED for evaluation of abdominal pain with nausea and vomiting.     # Small bowel obstruction   - high grade obstruction seen on SBFT from 7/13  - plan surgical exploration today  - gen surg folllowing      # Essential HTN  - elevated on admit  - hold olmesartan   - PRN IV hydralazine while NPO     # Hypothyroidism   - hold levothyroxine while NPO  - check TSH  - start IV levothyroxine if NPO > 72 hours, will start in AM if TSH not within normal range.  If TSH is within normal range, can hold off on starting levothyroxine for up to 1 week if necessary    Plan of care: inpt care.     Plan of care discussed with patient or family at bedside.    Ramana Stockton DO    Supplementary Documentation:     Quality:  DVT Prophylaxis: hep subcutaneous   CODE status: FULL  Gandhi: no  Central line: no  If COVID testing is negative, may discontinue isolation: yes     Estimated date of discharge: TBD  Discharge is dependent on: clinical course   At this point Ms. Nicolas is expected to be discharge to: home     Plan of care discussed with pt

## 2024-07-14 NOTE — PROGRESS NOTES
A&Ox4. VSS. RA. .  GI: Abdomen soft, nondistended. Passing gas, reports BM overnight.NG to LIS draining brown drainage.  Denies nausea.  : Voids.  Pain controlled with PRN pain medications  Up ad loida.  Diet:NPO   IVF running per order.  All appropriate safety measures in place. All questions and concerns addressed. Will continue to monitor

## 2024-07-14 NOTE — PROGRESS NOTES
Pt is alert and oriented. VSS on room air.   NGT came out while pt was in the bathroom, new NGT placed successfully and checked by Xray, secured at 53cm, brown output.  Pt is NPO, plan for surgery but would like to speak with surgeon before signing paperwork.   Pt reported that she passed gas and loose stool in bathroom. C diff to be r/o per protocol. Bowel sounds are hypoactive, belching. Poc discussed, call light in reach.

## 2024-07-14 NOTE — CONSULTS
[facility] Note: Automatic IV Levothyroxine Hold Protocol    Levothyroxine 100 mcg IV q24h was ordered by Dr. Stockton.  Component  Ref Range & Units 7/14/24 0600   TSH  0.358 - 3.740 mIU/mL TSH: 2.080     The patient meets criteria to hold IV levothyroxine for 7 days per P&T approved protocol.  Patient status will be monitored daily for eligibility to start oral levothyroxine.  If the patient is not able to be restarted on an oral dose within 7 days of the medication being ordered, IV levothyroxine will be started.  Pharmacy will continue to assess daily.    Thank you,   Mirtha Sharma, PharmD  7/14/202411:00 AM  [facility]: [phone]

## 2024-07-14 NOTE — PROGRESS NOTES
Suly Nicolas is a 84 year old female  Chief Complaint   Patient presents with    Abdomen/Flank Pain       Admitted with small bowel obstruction  Sbft suggests high grade sbo  Last nite patient passed flatus and had bm  Feels better this am  Exploration put on hold  Obstructive series ordered for re evaluation  Son at bedside            Past Medical History:    Essential hypertension    Thyroid disease     Past Surgical History:   Procedure Laterality Date    Appendectomy       No family history on file.  Social History     Socioeconomic History    Marital status:    Tobacco Use    Smoking status: Never    Smokeless tobacco: Never     Social Determinants of Health     Food Insecurity: No Food Insecurity (7/12/2024)    Food Insecurity     Food Insecurity: Never true   Transportation Needs: No Transportation Needs (7/12/2024)    Transportation Needs     Lack of Transportation: No   Housing Stability: Low Risk  (7/12/2024)    Housing Stability     Housing Instability: No           EXAM: abd soft  some distention  Non tender      IMAGING: sbft reviewed discussed with them        IMPRESSION: possible resolution of small bowel obstruction      PLAN: will re evaluated with obstructive series today  If significant improvement will observe  Otherwise may require exploration later today or tomorrow

## 2024-07-14 NOTE — BH PROGRESS NOTE
Warm packs for back pain, nausea resolved, ngt with brown output.  Pt. Wishes to talk with MD before signing consent.  Family at bedside.

## 2024-07-15 LAB
ANION GAP SERPL CALC-SCNC: 11 MMOL/L (ref 0–18)
BASOPHILS # BLD AUTO: 0.02 X10(3) UL (ref 0–0.2)
BASOPHILS NFR BLD AUTO: 0.3 %
BUN BLD-MCNC: 19 MG/DL (ref 9–23)
CALCIUM BLD-MCNC: 7.6 MG/DL (ref 8.5–10.1)
CHLORIDE SERPL-SCNC: 113 MMOL/L (ref 98–112)
CO2 SERPL-SCNC: 18 MMOL/L (ref 21–32)
CREAT BLD-MCNC: 0.67 MG/DL
EGFRCR SERPLBLD CKD-EPI 2021: 87 ML/MIN/1.73M2 (ref 60–?)
EOSINOPHIL # BLD AUTO: 0.05 X10(3) UL (ref 0–0.7)
EOSINOPHIL NFR BLD AUTO: 0.8 %
ERYTHROCYTE [DISTWIDTH] IN BLOOD BY AUTOMATED COUNT: 13.9 %
GLUCOSE BLD-MCNC: 65 MG/DL (ref 70–99)
GLUCOSE BLD-MCNC: 79 MG/DL (ref 70–99)
HCT VFR BLD AUTO: 34 %
HGB BLD-MCNC: 11.7 G/DL
IMM GRANULOCYTES # BLD AUTO: 0.02 X10(3) UL (ref 0–1)
IMM GRANULOCYTES NFR BLD: 0.3 %
LYMPHOCYTES # BLD AUTO: 1.64 X10(3) UL (ref 1–4)
LYMPHOCYTES NFR BLD AUTO: 25 %
MCH RBC QN AUTO: 31.7 PG (ref 26–34)
MCHC RBC AUTO-ENTMCNC: 34.4 G/DL (ref 31–37)
MCV RBC AUTO: 92.1 FL
MONOCYTES # BLD AUTO: 0.56 X10(3) UL (ref 0.1–1)
MONOCYTES NFR BLD AUTO: 8.5 %
NEUTROPHILS # BLD AUTO: 4.28 X10 (3) UL (ref 1.5–7.7)
NEUTROPHILS # BLD AUTO: 4.28 X10(3) UL (ref 1.5–7.7)
NEUTROPHILS NFR BLD AUTO: 65.1 %
OSMOLALITY SERPL CALC.SUM OF ELEC: 294 MOSM/KG (ref 275–295)
PLATELET # BLD AUTO: 212 10(3)UL (ref 150–450)
POTASSIUM SERPL-SCNC: 3.5 MMOL/L (ref 3.5–5.1)
RBC # BLD AUTO: 3.69 X10(6)UL
SODIUM SERPL-SCNC: 142 MMOL/L (ref 136–145)
WBC # BLD AUTO: 6.6 X10(3) UL (ref 4–11)

## 2024-07-15 PROCEDURE — 80048 BASIC METABOLIC PNL TOTAL CA: CPT | Performed by: INTERNAL MEDICINE

## 2024-07-15 PROCEDURE — 82962 GLUCOSE BLOOD TEST: CPT

## 2024-07-15 PROCEDURE — 85025 COMPLETE CBC W/AUTO DIFF WBC: CPT | Performed by: INTERNAL MEDICINE

## 2024-07-15 RX ORDER — KETOROLAC TROMETHAMINE 15 MG/ML
15 INJECTION, SOLUTION INTRAMUSCULAR; INTRAVENOUS ONCE AS NEEDED
Status: COMPLETED | OUTPATIENT
Start: 2024-07-15 | End: 2024-07-15

## 2024-07-15 NOTE — PLAN OF CARE
Patient alert and oriented x4, vital signs stable on room air. IVF infusing, up ad loida to void. Pt reports some back pain, treated per mar. Tolerating clear liquids. Safety measures in place, questions addressed, plan of care discussed with patient.

## 2024-07-15 NOTE — PROGRESS NOTES
[facility]   part of [facility] Hospitalist Progress Note     Suly Nicolas Patient Status:  Inpatient    1940 MRN [id number]   Location [facility] 3NW-A Attending Ramana Stockton, DO   Hosp Day # 3 PCP MANDY Velasco     Follow Up:  The encounter diagnosis was SBO (small bowel obstruction) (HCC).    Subjective:     Patient seen and examined.    Feeling much better.  Tube out.  Denies CP/SOB.  NAD.     Objective:    Review of Systems:   10 point ROS completed and was negative, except for pertinent positive and negatives stated in subjective.    Vital signs:  Temp:  [98.3 °F (36.8 °C)-98.7 °F (37.1 °C)] 98.6 °F (37 °C)  Pulse:  [84-99] 89  Resp:  [16-18] 16  BP: (138-150)/(53-67) 143/65  SpO2:  [95 %-97 %] 97 %    Physical Exam:    Gen: No acute distress, alert and oriented x3, no focal neurologic deficits  HEENT:  EOMI, PERRLA, OP clear, MMM.  NGT in place with bilious output.    Pulm: Lungs clear bilaterally, normal respiratory effort  CV: Heart with regular rate and rhythm, no murmur.  Normal PMI.    Abd: Abdomen soft, +mild TTP, moderately distended, no organomegaly, bowel sounds present  MSK: Full range of motion in extremities, no clubbing, no cyanosis  Skin: no rashes or lesions  Neuro:  Grossly intact, no focal deficits      Diagnostic Data:    Labs:  Recent Labs   Lab 07/12/24  0637 07/13/24  0824 07/14/24  0600 07/15/24  0605   WBC 12.3* 8.2  --  6.6   HGB 15.9 13.1  --  11.7*   MCV 89.6 91.4  --  92.1   .0 259.0 224.0 212.0       Recent Labs   Lab 24  0824  0600 07/15/24  0605   * 95 70 65*   BUN 18 23 26* 19   CREATSERUM 1.12* 0.84 0.74 0.67   CA 10.0 8.3* 7.7* 7.6*   ALB 4.0  --   --   --    * 141 140 142   K 5.3* 4.2 3.7 3.5    109 111 113*   CO2 22.0 21.0 20.0* 18.0*   ALKPHO 102  --   --   --    AST 32  --   --   --    ALT 27  --   --   --    BILT 0.9  --   --   --    TP 8.5*  --   --   --         Estimated Creatinine Clearance: 58.3 mL/min (based on SCr of 0.67 mg/dL).    No results for input(s): \"PTP\", \"INR\" in the last 168 hours.         COVID-19 Lab Results    COVID-19  No results found for: \"COVID19\"    Pro-Calcitonin  No results for input(s): \"PCT\" in the last 168 hours.    Cardiac  No results for input(s): \"TROP\", \"PBNP\" in the last 168 hours.    Creatinine Kinase  No results for input(s): \"CK\" in the last 168 hours.    Inflammatory Markers  No results for input(s): \"CRP\", \"FRANCO\", \"LDH\", \"DDIMER\" in the last 168 hours.    Imaging: Imaging data reviewed in Epic.    Medications:    levothyroxine  137 mcg Oral Before breakfast    pantoprazole  40 mg Intravenous Q24H    heparin  5,000 Units Subcutaneous Q8H QUENTIN       Assessment & Plan:      84 yr old female with PMH sig for HTN and hypothyroidism who presented to the ED for evaluation of abdominal pain with nausea and vomiting.     # Small bowel obstruction   - high grade obstruction seen on SBFT from 7/13  - initial plan surgical exploration put on hold given she's passing flatus/BM's  - gen surg folllowing   - FLD, ADAT per surgery  - dc fluids     # Essential HTN  - elevated on admit  - hold olmesartan   - PRN IV hydralazine while NPO     # Hypothyroidism   - resume po levothyroxine    Plan of care: inpt care.     Plan of care discussed with patient or family at bedside.    Dilma Rizo MD     Supplementary Documentation:     Quality:  DVT Prophylaxis: hep subcutaneous   CODE status: FULL  Gandhi: no  Central line: no  If COVID testing is negative, may discontinue isolation: yes     Estimated date of discharge: tomorrow   Discharge is dependent on: clinical course   At this point Ms. Nicolas is expected to be discharge to: home     Plan of care discussed with pt

## 2024-07-15 NOTE — PROGRESS NOTES
[facility]  Progress Note    Suly Nicolas Patient Status:  Inpatient    1940 MRN [id number]   Location [facility] 3NW-A Attending Ramana Stockton, DO   Hosp Day # 3 PCP MANDY Velasco     Subjective:    Patient reports passing flatus and BM  She is tolerating clear liquids    Objective/Physical Exam:    Vital Signs:  Blood pressure 143/65, pulse 89, temperature 98.6 °F (37 °C), temperature source Oral, resp. rate 16, height 5' 5\" (1.651 m), weight 198 lb (89.8 kg), SpO2 97%.    General:  Alert, orientated x3.  Cooperative.  No apparent distress.    Lungs:  Non labored breathing    Cardiac:  Regular rate and rhythm.     Abdomen:  mild periumbilical pain, mildly distended, soft no peritonitis     Extremities:  No lower extremity edema noted.  Without clubbing or cyanosis.        Labs:  Reviewed    Lab Results   Component Value Date    WBC 6.6 07/15/2024    HGB 11.7 07/15/2024    HCT 34.0 07/15/2024    .0 07/15/2024    CREATSERUM 0.67 07/15/2024    BUN 19 07/15/2024     07/15/2024    K 3.5 07/15/2024     07/15/2024    CO2 18.0 07/15/2024    GLU 65 07/15/2024    CA 7.6 07/15/2024    PGLU 79 07/15/2024     Problem List:  Patient Active Problem List   Diagnosis    SBO (small bowel obstruction) (HCC)       Impression:     85 y/o with SBO- resolving  Passing flatus, BM  NG out tolerating clear liquids  Soft, mild distension, mild periumbilical tenderness    Plan:    Will advance to full liquids  Encourage ambulation/IS  Hep lock IV fluids  All questions answered  DW MNADY Mojica  [facility]  7/15/2024

## 2024-07-15 NOTE — PLAN OF CARE
Alert & oriented x4. VSS on room air. Voids. Tolerating clear liquid diet, advanced to fulls. Ambulates independently. Denies nausea/chest pain/SOB. Pain controlled with heat packs. Patient updated on plan of care. Questions and concerns addressed.

## 2024-07-16 VITALS
TEMPERATURE: 98 F | RESPIRATION RATE: 16 BRPM | DIASTOLIC BLOOD PRESSURE: 94 MMHG | HEIGHT: 65 IN | BODY MASS INDEX: 32.99 KG/M2 | OXYGEN SATURATION: 97 % | WEIGHT: 198 LBS | HEART RATE: 96 BPM | SYSTOLIC BLOOD PRESSURE: 146 MMHG

## 2024-07-16 LAB
ANION GAP SERPL CALC-SCNC: 6 MMOL/L (ref 0–18)
BASOPHILS # BLD AUTO: 0.01 X10(3) UL (ref 0–0.2)
BASOPHILS NFR BLD AUTO: 0.2 %
BUN BLD-MCNC: 12 MG/DL (ref 9–23)
CALCIUM BLD-MCNC: 8 MG/DL (ref 8.5–10.1)
CHLORIDE SERPL-SCNC: 111 MMOL/L (ref 98–112)
CO2 SERPL-SCNC: 22 MMOL/L (ref 21–32)
CREAT BLD-MCNC: 0.58 MG/DL
EGFRCR SERPLBLD CKD-EPI 2021: 90 ML/MIN/1.73M2 (ref 60–?)
EOSINOPHIL # BLD AUTO: 0.08 X10(3) UL (ref 0–0.7)
EOSINOPHIL NFR BLD AUTO: 1.4 %
ERYTHROCYTE [DISTWIDTH] IN BLOOD BY AUTOMATED COUNT: 13.5 %
GLUCOSE BLD-MCNC: 90 MG/DL (ref 70–99)
HCT VFR BLD AUTO: 35.6 %
HGB BLD-MCNC: 12.4 G/DL
IMM GRANULOCYTES # BLD AUTO: 0.02 X10(3) UL (ref 0–1)
IMM GRANULOCYTES NFR BLD: 0.4 %
LYMPHOCYTES # BLD AUTO: 1.34 X10(3) UL (ref 1–4)
LYMPHOCYTES NFR BLD AUTO: 24 %
MCH RBC QN AUTO: 31.5 PG (ref 26–34)
MCHC RBC AUTO-ENTMCNC: 34.8 G/DL (ref 31–37)
MCV RBC AUTO: 90.4 FL
MONOCYTES # BLD AUTO: 0.58 X10(3) UL (ref 0.1–1)
MONOCYTES NFR BLD AUTO: 10.4 %
NEUTROPHILS # BLD AUTO: 3.55 X10 (3) UL (ref 1.5–7.7)
NEUTROPHILS # BLD AUTO: 3.55 X10(3) UL (ref 1.5–7.7)
NEUTROPHILS NFR BLD AUTO: 63.6 %
OSMOLALITY SERPL CALC.SUM OF ELEC: 287 MOSM/KG (ref 275–295)
PLATELET # BLD AUTO: 222 10(3)UL (ref 150–450)
POTASSIUM SERPL-SCNC: 3.2 MMOL/L (ref 3.5–5.1)
RBC # BLD AUTO: 3.94 X10(6)UL
SODIUM SERPL-SCNC: 139 MMOL/L (ref 136–145)
WBC # BLD AUTO: 5.6 X10(3) UL (ref 4–11)

## 2024-07-16 PROCEDURE — 85025 COMPLETE CBC W/AUTO DIFF WBC: CPT | Performed by: HOSPITALIST

## 2024-07-16 PROCEDURE — 80048 BASIC METABOLIC PNL TOTAL CA: CPT | Performed by: HOSPITALIST

## 2024-07-16 RX ORDER — POTASSIUM CHLORIDE 20 MEQ/1
40 TABLET, EXTENDED RELEASE ORAL ONCE
Status: COMPLETED | OUTPATIENT
Start: 2024-07-16 | End: 2024-07-16

## 2024-07-16 NOTE — PLAN OF CARE
NURSING DISCHARGE NOTE    Discharged Home via Ambulatory.  Accompanied by Family member  Belongings Taken by patient/family.    VSS, tolerating full liquid diet, voiding adequately, pain controlled, tolerating ambulation. Discharge education provided. Reviewed medications and follow up appts. All questions answered and concerns addressed, pt verbalized understanding. IV removed. Pt dc in stable condition. Patient declined wheelchair and left unit with family member at 1704

## 2024-07-16 NOTE — PLAN OF CARE
Alert & oriented x4. VSS on room air. Voids. Tolerating full liquids. Ambulates independently. Encouraged ambulation in halls to decrease bloating. Denies nausea/chest pain/SOB. Pain to back controlled with heat packs. Patient updated on plan of care. Questions and concerns addressed.

## 2024-07-16 NOTE — PROGRESS NOTES
[facility]   part of [facility] Hospitalist Progress Note     Suly Nicolas Patient Status:  Inpatient    1940 MRN [id number]   Location [facility] 3NW-A Attending Ramana Stockton, DO   Hosp Day # 4 PCP MANDY Velasco     Follow Up:  The encounter diagnosis was SBO (small bowel obstruction) (HCC).    Subjective:     Patient seen and examined.    Feeling much better.  Back pain, chronic.   Denies CP/SOB.  NAD.     Objective:    Review of Systems:   10 point ROS completed and was negative, except for pertinent positive and negatives stated in subjective.    Vital signs:  Temp:  [98.1 °F (36.7 °C)-98.4 °F (36.9 °C)] 98.4 °F (36.9 °C)  Pulse:  [79-92] 79  Resp:  [16-17] 16  BP: (125-137)/(43-72) 125/43  SpO2:  [97 %-98 %] 97 %    Physical Exam:    Gen: No acute distress, alert and oriented x3, no focal neurologic deficits  HEENT:  EOMI, PERRLA, OP clear, MMM.  NGT in place with bilious output.    Pulm: Lungs clear bilaterally, normal respiratory effort  CV: Heart with regular rate and rhythm, no murmur.  Normal PMI.    Abd: Abdomen soft, +mild TTP, moderately distended, no organomegaly, bowel sounds present  MSK: Full range of motion in extremities, no clubbing, no cyanosis  Skin: no rashes or lesions  Neuro:  Grossly intact, no focal deficits      Diagnostic Data:    Labs:  Recent Labs   Lab 24  0637 24  0824  0600 07/15/24  0605 24  0617   WBC 12.3* 8.2  --  6.6 5.6   HGB 15.9 13.1  --  11.7* 12.4   MCV 89.6 91.4  --  92.1 90.4   .0 259.0 224.0 212.0 222.0       Recent Labs   Lab 24  0637 24  0824 24  0600 07/15/24  0605 24  0617   *   < > 70 65* 90   BUN 18   < > 26* 19 12   CREATSERUM 1.12*   < > 0.74 0.67 0.58   CA 10.0   < > 7.7* 7.6* 8.0*   ALB 4.0  --   --   --   --    *   < > 140 142 139   K 5.3*   < > 3.7 3.5 3.2*      < > 111 113* 111   CO2 22.0   < > 20.0* 18.0* 22.0   ALKPHO  102  --   --   --   --    AST 32  --   --   --   --    ALT 27  --   --   --   --    BILT 0.9  --   --   --   --    TP 8.5*  --   --   --   --     < > = values in this interval not displayed.       Estimated Creatinine Clearance: 67.3 mL/min (based on SCr of 0.58 mg/dL).    No results for input(s): \"PTP\", \"INR\" in the last 168 hours.         COVID-19 Lab Results    COVID-19  No results found for: \"COVID19\"    Pro-Calcitonin  No results for input(s): \"PCT\" in the last 168 hours.    Cardiac  No results for input(s): \"TROP\", \"PBNP\" in the last 168 hours.    Creatinine Kinase  No results for input(s): \"CK\" in the last 168 hours.    Inflammatory Markers  No results for input(s): \"CRP\", \"FRANCO\", \"LDH\", \"DDIMER\" in the last 168 hours.    Imaging: Imaging data reviewed in Epic.    Medications:    levothyroxine  137 mcg Oral Before breakfast    pantoprazole  40 mg Intravenous Q24H    heparin  5,000 Units Subcutaneous Q8H QUENTIN       Assessment & Plan:      84 yr old female with PMH sig for HTN and hypothyroidism who presented to the ED for evaluation of abdominal pain with nausea and vomiting.     # Small bowel obstruction   - high grade obstruction seen on SBFT from 7/13  - initial plan surgical exploration put on hold given she's passing flatus/BM's  - gen surg folllowing   - FLD, ADAT per surgery  - dc fluids     # Essential HTN  - elevated on admit  - hold olmesartan   - PRN IV hydralazine while NPO     # Hypothyroidism   - resume po levothyroxine    Plan of care: inpt care.     Plan of care discussed with patient or family at bedside.    Dilma Rizo MD     Supplementary Documentation:     Quality:  DVT Prophylaxis: hep subcutaneous   CODE status: FULL  Gandhi: no  Central line: no  If COVID testing is negative, may discontinue isolation: yes     Estimated date of discharge: tomorrow   Discharge is dependent on: clinical course   At this point Ms. Nicolas is expected to be discharge to: home     Plan of care discussed  with pt

## 2024-07-16 NOTE — PROGRESS NOTES
[facility]  Progress Note    Suly Nicolas Patient Status:  Inpatient    1940 MRN [id number]   Location [facility] 3NW-A Attending Ramana Stockton, DO   Hosp Day # 4 PCP MANDY Velasco     Subjective:    Patient reports she is passing flatus, no BM since yesterday  She is tolerating full liquids  Complains of back pain   Notes crampy pain has improved     Objective/Physical Exam:    Vital Signs:  Blood pressure 125/43, pulse 79, temperature 98.4 °F (36.9 °C), temperature source Oral, resp. rate 16, height 5' 5\" (1.651 m), weight 198 lb (89.8 kg), SpO2 97%.    General:  Alert, orientated x3.  Cooperative.  No apparent distress.    Lungs:  Non labored breathing    Cardiac:  Regular rate and rhythm.     Abdomen:  soft, improved distension, minimal tenderness periumbilically     Extremities:  No lower extremity edema noted.  Without clubbing or cyanosis.        Labs:  Reviewed    Lab Results   Component Value Date    WBC 5.6 2024    HGB 12.4 2024    HCT 35.6 2024    .0 2024    CREATSERUM 0.58 2024    BUN 12 2024     2024    K 3.2 2024     2024    CO2 22.0 2024    GLU 90 2024    CA 8.0 2024       Problem List:  Patient Active Problem List   Diagnosis    SBO (small bowel obstruction) ([facility])       Impression:     85 y/o with SBO- resolved  Tolerating full liquids  Improved pain and distension  C/o back pain     Plan:    Reviewed and encouraged ambulation  Full liquids, reviewed if tolerating she can be dc home on fulls and start soft foods at home   Will discuss with Dr Reynolds  All questions answered    MANDY Francisco  [facility]  2024

## (undated) NOTE — LETTER
[address]  [address]  Authorization for Surgical Operation and Procedure     Date:___________                                                                                                         Time:__________  I hereby authorize  Waqas Reynolds MD, my physician and his/her assistants (if applicable), which may include medical students, residents, and/or fellows, to perform the following surgical operation/ procedure and administer such anesthesia as may be determined necessary by my physician:  Operation/Procedure name (s) LAPAROTOMY, LYSIS OF ADHESIONS, POSSIBLE BOWEL RESECTION on Suly Nicolas   2.   I recognize that during the surgical operation/procedure, unforeseen conditions may necessitate additional or different procedures than those listed above.  I, therefore, further authorize and request that the above-named surgeon, assistants, or designees perform such procedures as are, in their judgment, necessary and desirable.    3.   My surgeon/physician has discussed prior to my surgery the potential benefits, risks and side effects of this procedure; the likelihood of achieving goals; and potential problems that might occur during recuperation.  They also discussed reasonable alternatives to the procedure, including risks, benefits, and side effects related to the alternatives and risks related to not receiving this procedure.  I have had all my questions answered and I acknowledge that no guarantee has been made as to the result that may be obtained.    4.   Should the need arise during my operation/procedure, which includes change of level of care prior to discharge, I also consent to the administration of blood and/or blood products.  Further, I understand that despite careful testing and screening of blood or blood products by collecting agencies, I may still be subject to ill effects as a result of receiving a blood transfusion and/or blood products.  The  following are some, but not all, of the potential risks that can occur: fever and allergic reactions, hemolytic reactions, transmission of diseases such as Hepatitis, AIDS and Cytomegalovirus (CMV) and fluid overload.  In the event that I wish to have an autologous transfusion of my own blood, or a directed donor transfusion, I will discuss this with my physician.  Check only if Refusing Blood or Blood Products  I understand refusal of blood or blood products as deemed necessary by my physician may have serious consequences to my condition to include possible death. I hereby assume responsibility for my refusal and release the hospital, its personnel, and my physicians from any responsibility for the consequences of my refusal.          o  Refuse      5.   I authorize the use of any specimen, organs, tissues, body parts or foreign objects that may be removed from my body during the operation/procedure for diagnosis, research or teaching purposes and their subsequent disposal by hospital authorities.  I also authorize the release of specimen test results and/or written reports to my treating physician on the hospital medical staff or other referring or consulting physicians involved in my care, at the discretion of the Pathologist or my treating physician.    6.   I consent to the photographing or videotaping of the operations or procedures to be performed, including appropriate portions of my body for medical, scientific, or educational purposes, provided my identity is not revealed by the pictures or by descriptive texts accompanying them.  If the procedure has been photographed/videotaped, the surgeon will obtain the original picture, image, videotape or CD.  The hospital will not be responsible for storage, release or maintenance of the picture, image, tape or CD.    7.   I consent to the presence of a  or observers in the operating room as deemed necessary by my physician or their designees.     8.   I recognize that in the event my procedure results in extended X-Ray/fluoroscopy time, I may develop a skin reaction.    9. If I have a Do Not Attempt Resuscitation (DNAR) order in place, that status will be suspended while in the operating room, procedural suite, and during the recovery period unless otherwise explicitly stated by me (or a person authorized to consent on my behalf). The surgeon or my attending physician will determine when the applicable recovery period ends for purposes of reinstating the DNAR order.  10. Patients having a sterilization procedure: I understand that if the procedure is successful the results will be permanent and it will therefore be impossible for me to inseminate, conceive, or bear children.  I also understand that the procedure is intended to result in sterility, although the result has not been guaranteed.   11. I acknowledge that my physician has explained sedation/analgesia administration to me including the risk and benefits I consent to the administration of sedation/analgesia as may be necessary or desirable in the judgment of my physician.    I CERTIFY THAT I HAVE READ AND FULLY UNDERSTAND THE ABOVE CONSENT TO OPERATION and/or OTHER PROCEDURE.    _________________________________________  __________________________________  Signature of Patient     Signature of Responsible Person         ___________________________________         Printed Name of Responsible Person           _________________________________                 Relationship to Patient  _________________________________________  ______________________________  Signature of Witness          Date  Time      Patient Name: Suly Nicolas     : 1940                 Printed: 2024     Medical Record #: [id number]                     Page [address] of [address] [place], [place] [address], [place]  [address]    Consent for Anesthesia    I, Suly Nicolas agree to  be cared for by an anesthesiologist, who is specially trained to monitor me and give me medicine to put me to sleep or keep me comfortable during my procedure    I understand that my anesthesiologist is not an employee or agent of [facility] or [employer]. He or she works for [employer].    As the patient asking for anesthesia services, I agree to:  Allow the anesthesiologist (anesthesia doctor) to give me medicine and do additional procedures as necessary. Some examples are: Starting or using an “IV” to give me medicine, fluids or blood during my procedure, and having a breathing tube placed to help me breathe when I’m asleep (intubation). In the event that my heart stops working properly, I understand that my anesthesiologist will make every effort to sustain my life, unless otherwise directed by [facility] Do Not Resuscitate documents.  Tell my anesthesia doctor before my procedure:  If I am pregnant.  The last time that I ate or drank.  All of the medicines I take (including prescriptions, herbal supplements, and pills I can buy without a prescription (including street drugs/illegal medications). Failure to inform my anesthesiologist about these medicines may increase my risk of anesthetic complications.  If I am allergic to anything or have had a reaction to anesthesia before.  I understand how the anesthesia medicine will help me (benefits).  I understand that with any type of anesthesia medicine there are risks:  The most common risks are: nausea, vomiting, sore throat, muscle soreness, damage to my eyes, mouth, or teeth (from breathing tube placement).  Rare risks include: remembering what happened during my procedure, allergic reactions to medications, injury to my airway, heart, lungs, vision, nerves, or muscles and in extremely rare instances death.  My doctor has explained to me other choices available to me for my care (alternatives).  Pregnant Patients  (“epidural”):  I understand that the risks of having an epidural (medicine given into my back to help control pain during labor), include itching, low blood pressure, difficulty urinating, headache or slowing of the baby’s heart. Very rare risks include infection, bleeding, seizure, irregular heart rhythms and nerve injury.  Regional Anesthesia (“spinal”, “epidural”, & “nerve blocks”):  I understand that rare but potential complications include headache, bleeding, infection, seizure, irregular heart rhythms, and nerve injury.    I can change my mind about having anesthesia services at any time before I get the medicine.    _____________________________________________________________________________  Patient (or Representative) Signature/Relationship to Patient  Date   Time    _____________________________________________________________________________   Name (if used)    Language/Organization   Time    _____________________________________________________________________________  Anesthesiologist Signature     Date   Time  I have discussed the procedure and information above with the patient (or patient’s representative) and answered their questions. The patient or their representative has agreed to have anesthesia services.    _____________________________________________________________________________  Witness        Date   Time  I have verified that the signature is that of the patient or patient’s representative, and that it was signed before the procedure  Patient Name: Suly Nicolas     : 1940                 Printed: 2024     Medical Record #: [id number]                     Page 2 of 2